# Patient Record
Sex: FEMALE | Race: WHITE
[De-identification: names, ages, dates, MRNs, and addresses within clinical notes are randomized per-mention and may not be internally consistent; named-entity substitution may affect disease eponyms.]

---

## 2020-11-28 ENCOUNTER — HOSPITAL ENCOUNTER (EMERGENCY)
Dept: HOSPITAL 53 - M ED | Age: 55
Discharge: HOME | End: 2020-11-28
Payer: COMMERCIAL

## 2020-11-28 VITALS — BODY MASS INDEX: 21.76 KG/M2 | HEIGHT: 61 IN | WEIGHT: 115.24 LBS

## 2020-11-28 VITALS — DIASTOLIC BLOOD PRESSURE: 78 MMHG | SYSTOLIC BLOOD PRESSURE: 130 MMHG

## 2020-11-28 DIAGNOSIS — D69.6: Primary | ICD-10-CM

## 2020-11-28 DIAGNOSIS — M25.50: ICD-10-CM

## 2020-11-28 LAB
BASOPHILS # BLD AUTO: 0 10^3/UL (ref 0–0.2)
BASOPHILS NFR BLD AUTO: 0.7 % (ref 0–1)
BUN SERPL-MCNC: 12 MG/DL (ref 7–18)
CALCIUM SERPL-MCNC: 8.3 MG/DL (ref 8.5–10.1)
CHLAMYDIA DNA AMPLIFICATION: NEGATIVE
CHLORIDE SERPL-SCNC: 105 MEQ/L (ref 98–107)
CO2 SERPL-SCNC: 28 MEQ/L (ref 21–32)
CREAT SERPL-MCNC: 0.77 MG/DL (ref 0.55–1.3)
CRP SERPL-MCNC: 4.2 MG/DL (ref 0–0.3)
EOSINOPHIL # BLD AUTO: 0.1 10^3/UL (ref 0–0.5)
EOSINOPHIL NFR BLD AUTO: 1.1 % (ref 0–3)
ERYTHROCYTE [SEDIMENTATION RATE] IN BLOOD BY WESTERGREN METHOD: 54 MM/HR (ref 0–30)
FLUAV RNA UPPER RESP QL NAA+PROBE: NEGATIVE
FLUBV RNA UPPER RESP QL NAA+PROBE: NEGATIVE
GFR SERPL CREATININE-BSD FRML MDRD: > 60 ML/MIN/{1.73_M2} (ref 51–?)
GLUCOSE SERPL-MCNC: 81 MG/DL (ref 70–100)
HCT VFR BLD AUTO: 38.6 % (ref 36–47)
HETEROPH AB SER QL LA: NEGATIVE
HGB BLD-MCNC: 12.4 G/DL (ref 12–15.5)
LYMPHOCYTES # BLD AUTO: 0.4 10^3/UL (ref 1.5–5)
LYMPHOCYTES NFR BLD AUTO: 10 % (ref 24–44)
MCH RBC QN AUTO: 28.8 PG (ref 27–33)
MCHC RBC AUTO-ENTMCNC: 32.1 G/DL (ref 32–36.5)
MCV RBC AUTO: 89.8 FL (ref 80–96)
MONOCYTES # BLD AUTO: 0.4 10^3/UL (ref 0–0.8)
MONOCYTES NFR BLD AUTO: 10 % (ref 0–5)
N GONORRHOEA RRNA SPEC QL NAA+PROBE: NEGATIVE
NEUTROPHILS # BLD AUTO: 3.4 10^3/UL (ref 1.5–8.5)
NEUTROPHILS NFR BLD AUTO: 77.3 % (ref 36–66)
PLATELET # BLD AUTO: 145 10^3/UL (ref 150–450)
POTASSIUM SERPL-SCNC: 3.9 MEQ/L (ref 3.5–5.1)
RBC # BLD AUTO: 4.3 10^6/UL (ref 4–5.4)
RHEUMATOID FACT SERPL-ACNC: < 10 IU/ML (ref ?–15)
SODIUM SERPL-SCNC: 137 MEQ/L (ref 136–145)
WBC # BLD AUTO: 4.4 10^3/UL (ref 4–10)

## 2020-11-30 ENCOUNTER — HOSPITAL ENCOUNTER (EMERGENCY)
Dept: HOSPITAL 53 - M ED | Age: 55
LOS: 1 days | Discharge: HOME | End: 2020-12-01
Payer: COMMERCIAL

## 2020-11-30 VITALS — BODY MASS INDEX: 23.39 KG/M2 | WEIGHT: 123.9 LBS | HEIGHT: 61 IN

## 2020-11-30 DIAGNOSIS — M79.10: ICD-10-CM

## 2020-11-30 DIAGNOSIS — R50.9: Primary | ICD-10-CM

## 2020-11-30 LAB
ALBUMIN SERPL BCG-MCNC: 3 GM/DL (ref 3.2–5.2)
ALT SERPL W P-5'-P-CCNC: 47 U/L (ref 12–78)
ANISOCYTOSIS BLD QL SMEAR: (no result)
APTT BLD: 31.7 SECONDS (ref 24.2–38.5)
BASE EXCESS BLDA CALC-SCNC: -0.1 MMOL/L (ref -2–2)
BASOPHILS NFR BLD MANUAL: 1 % (ref 0–1)
BILIRUB CONJ SERPL-MCNC: 0.2 MG/DL (ref 0–0.2)
BILIRUB SERPL-MCNC: 0.5 MG/DL (ref 0.2–1)
BUN SERPL-MCNC: 10 MG/DL (ref 7–18)
CALCIUM SERPL-MCNC: 8.3 MG/DL (ref 8.5–10.1)
CHLORIDE SERPL-SCNC: 105 MEQ/L (ref 98–107)
CO2 BLDA CALC-SCNC: 24.3 MEQ/L (ref 22–29)
CO2 SERPL-SCNC: 27 MEQ/L (ref 21–32)
CREAT SERPL-MCNC: 0.84 MG/DL (ref 0.55–1.3)
CRP SERPL-MCNC: 7 MG/DL (ref 0–0.3)
GFR SERPL CREATININE-BSD FRML MDRD: > 60 ML/MIN/{1.73_M2} (ref 51–?)
GLUCOSE SERPL-MCNC: 81 MG/DL (ref 70–100)
HCO3 BLDA-SCNC: 23.3 MEQ/L (ref 22–26)
HCO3 STD BLDA-SCNC: 24.4 MEQ/L (ref 22–26)
HCT VFR BLD AUTO: 35.4 % (ref 36–47)
HGB BLD-MCNC: 11.5 G/DL (ref 12–15.5)
INR PPP: 0.94
LYMPHOCYTES NFR BLD MANUAL: 24 % (ref 16–44)
MCH RBC QN AUTO: 28.9 PG (ref 27–33)
MCHC RBC AUTO-ENTMCNC: 32.5 G/DL (ref 32–36.5)
MCV RBC AUTO: 88.9 FL (ref 80–96)
MONOCYTES NFR BLD MANUAL: 11 % (ref 0–5)
NEUTROPHILS NFR BLD MANUAL: 35 % (ref 28–66)
PCO2 BLDA: 33.6 MMHG (ref 35–45)
PH BLDA: 7.46 UNITS (ref 7.35–7.45)
PLATELET # BLD AUTO: 131 10^3/UL (ref 150–450)
PLATELET BLD QL SMEAR: (no result)
PO2 BLDA: 103.1 MMHG (ref 75–100)
POTASSIUM SERPL-SCNC: 3.7 MEQ/L (ref 3.5–5.1)
PROT SERPL-MCNC: 6.8 GM/DL (ref 6.4–8.2)
PROTHROMBIN TIME: 12.8 SECONDS (ref 12.5–14.3)
RBC # BLD AUTO: 3.98 10^6/UL (ref 4–5.4)
SAO2 % BLDA: 97.9 % (ref 95–99)
SODIUM SERPL-SCNC: 137 MEQ/L (ref 136–145)
VARIANT LYMPHS NFR BLD MANUAL: 4 % (ref 0–5)
WBC # BLD AUTO: 4.8 10^3/UL (ref 4–10)

## 2020-11-30 PROCEDURE — 85730 THROMBOPLASTIN TIME PARTIAL: CPT

## 2020-11-30 PROCEDURE — 83605 ASSAY OF LACTIC ACID: CPT

## 2020-11-30 PROCEDURE — 85610 PROTHROMBIN TIME: CPT

## 2020-11-30 PROCEDURE — 87486 CHLMYD PNEUM DNA AMP PROBE: CPT

## 2020-11-30 PROCEDURE — 96361 HYDRATE IV INFUSION ADD-ON: CPT

## 2020-11-30 PROCEDURE — 82803 BLOOD GASES ANY COMBINATION: CPT

## 2020-11-30 PROCEDURE — 87798 DETECT AGENT NOS DNA AMP: CPT

## 2020-11-30 PROCEDURE — 85025 COMPLETE CBC W/AUTO DIFF WBC: CPT

## 2020-11-30 PROCEDURE — 80048 BASIC METABOLIC PNL TOTAL CA: CPT

## 2020-11-30 PROCEDURE — 93005 ELECTROCARDIOGRAM TRACING: CPT

## 2020-11-30 PROCEDURE — 96374 THER/PROPH/DIAG INJ IV PUSH: CPT

## 2020-11-30 PROCEDURE — 99285 EMERGENCY DEPT VISIT HI MDM: CPT

## 2020-11-30 PROCEDURE — 87581 M.PNEUMON DNA AMP PROBE: CPT

## 2020-11-30 PROCEDURE — 86140 C-REACTIVE PROTEIN: CPT

## 2020-11-30 PROCEDURE — 80076 HEPATIC FUNCTION PANEL: CPT

## 2020-11-30 PROCEDURE — 87633 RESP VIRUS 12-25 TARGETS: CPT

## 2020-11-30 PROCEDURE — 87040 BLOOD CULTURE FOR BACTERIA: CPT

## 2020-11-30 PROCEDURE — 71045 X-RAY EXAM CHEST 1 VIEW: CPT

## 2020-11-30 NOTE — REPVR
PROCEDURE INFORMATION: 

Exam: XR Chest, 1 View 

Exam date and time: 11/30/2020 8:20 PM 

Age: 55 years old 

Clinical indication: Other: Fever 



TECHNIQUE: 

Imaging protocol: XR of the chest 

Views: 1 view. 



COMPARISON: 

No relevant prior studies available. 



FINDINGS: 

Lungs: Unremarkable. No consolidation. 

Pleural space: Unremarkable. No pleural effusion. No pneumothorax. 

Heart/Mediastinum: Unremarkable. No cardiomegaly. 

Bones/joints: Unremarkable. 



IMPRESSION: 

No acute findings. 



Electronically signed by: Iron De La Rosa On 11/30/2020  20:47:55 PM

## 2020-12-01 VITALS — SYSTOLIC BLOOD PRESSURE: 94 MMHG | DIASTOLIC BLOOD PRESSURE: 56 MMHG

## 2020-12-01 LAB — EBV NA IGG SER-ACNC: 18.3 U/ML (ref 0–17.9)

## 2020-12-01 NOTE — ECGEPIP
[facility] - ED

                                       

                                       Test Date:    2020

Pat Name:     GAURAV SARAH             Department:   

Patient ID:   [id number]                 Room:         -

Gender:       Female                   Technician:   

:          [id number]               Requested By: NOHEMI FRANKEL 

Order Number: [id number]     Reading MD:   Jayesh Alfonso

                                 Measurements

Intervals                              Axis          

Rate:         82                       P:            53

SC:           158                      QRS:          58

QRSD:         94                       T:            46

QT:           347                                    

QTc:          406                                    

                           Interpretive Statements

SINUS RHYTHM

NO PRIORS FOR COMPARISON

Electronically Signed on 2020 5:33:55 EST by Jayesh Alfonso

## 2021-10-27 ENCOUNTER — HOSPITAL ENCOUNTER (OUTPATIENT)
Dept: HOSPITAL 53 - M RAD | Age: 56
End: 2021-10-27
Attending: PHYSICIAN ASSISTANT
Payer: COMMERCIAL

## 2021-10-27 DIAGNOSIS — Z80.52: ICD-10-CM

## 2021-10-27 DIAGNOSIS — R10.2: Primary | ICD-10-CM

## 2021-10-27 DIAGNOSIS — R30.0: ICD-10-CM

## 2021-10-27 NOTE — REP
INDICATION:

PELVIC PAIN/PRESSURE W/ UTI'S, DYSURIA, FM HX.



COMPARISON:

None.



TECHNIQUE:

Real-time sonographic evaluation of the kidneys is performed.



FINDINGS:

Renal cortical echogenicity pattern is normal bilaterally and contours are smooth.



There is no evidence of hydronephrosis, cyst, mass, or calculus in either kidney.



The right kidney measures 9.7 x 4.7 x 4.0 cm.



Left renal dimensions are 10.1 x 4.4 x 5.1 cm.









IMPRESSION:

Negative renal ultrasound.





<Electronically signed by Derek Holliday > 10/27/21 115

## 2021-10-27 NOTE — REP
INDICATION:

PELVIC PAIN/PRESSURE W/ UTI'S, DYSURIA, FM HX.



COMPARISON:

None.



TECHNIQUE:

Real-time transvesical sonographic evaluation of the urinary bladder with Doppler



FINDINGS:

The pre void urinary bladder volume calculation is 572.6 cc and the postvoid urinary

bladder volume calculation is 99.7 cc.  This renders a 17.4% postvoid residual.  No

bladder masses were identified.  Doppler at the UV junction shows uro jet phenomena

bilaterally.



IMPRESSION:

As above





<Electronically signed by Silvino Sinclair > 10/27/21 [id number]

## 2021-11-30 ENCOUNTER — HOSPITAL ENCOUNTER (OUTPATIENT)
Dept: HOSPITAL 53 - M SMT | Age: 56
End: 2021-11-30
Attending: SPECIALIST
Payer: COMMERCIAL

## 2021-11-30 DIAGNOSIS — R10.2: Primary | ICD-10-CM

## 2021-11-30 LAB
APPEARANCE UR: (no result)
BACTERIA URNS QL MICRO: (no result)
BILIRUB UR QL STRIP: (no result)
COLOR UR: YELLOW
GLUCOSE UR STRIP-MCNC: (no result) MG/DL
HGB UR QL STRIP: (no result)
HYALINE CASTS URNS QL MICRO: (no result) /LPF (ref 0–1)
KETONES UR QL STRIP: (no result) MG/DL
LEUKOCYTE ESTERASE UR QL STRIP: (no result)
NITRITE UR QL STRIP: (no result)
PH UR STRIP: (no result) UNITS (ref 5–7)
PROT UR STRIP-MCNC: (no result) MG/DL
RBC #/AREA URNS HPF: (no result) /HPF (ref 0–3)
SP GR UR STRIP: (no result) (ref 1–1.03)
SQUAMOUS URNS QL MICRO: (no result) /HPF
UROBILINOGEN UR QL STRIP: (no result) MG/DL
WBC #/AREA URNS HPF: (no result) /HPF (ref 0–3)

## 2021-12-06 ENCOUNTER — HOSPITAL ENCOUNTER (OUTPATIENT)
Dept: HOSPITAL 53 - M RAD | Age: 56
End: 2021-12-06
Attending: SPECIALIST
Payer: COMMERCIAL

## 2021-12-06 DIAGNOSIS — R10.2: Primary | ICD-10-CM

## 2021-12-07 NOTE — REP
INDICATION:

PELVIC PRESSURE



COMPARISON:

None.



TECHNIQUE:

Transabdominal pelvic ultrasound followed by transvaginal examination for better

evaluation of the endometrium and adnexa with color Doppler evaluation of the ovaries.



FINDINGS:

Bladder is unremarkable and measures 10.0 x 7.2 x 5.5 cm.



Normal anteverted uterus measures 7.6 x 4.1 x 5.0 cm.  The endometrial complex

measures 1.6 mm thickness.  No discrete uterine or endometrial abnormalities are

appreciated.



Bilateral ovaries are not visualized on either transabdominal or transvaginal imaging.



No pelvic fluid or adnexal mass lesion identified.



IMPRESSION:

Normal uterus.

Ovaries not visualized.





<Electronically signed by Sawyer Figueroa > 12/07/21 [id number]